# Patient Record
(demographics unavailable — no encounter records)

---

## 2024-12-29 NOTE — HISTORY OF PRESENT ILLNESS
[de-identified] : F/u vaccine  [FreeTextEntry6] : pt is here for flu vaccine  states that he has been doing well recently had a slight runny nose and cough

## 2024-12-29 NOTE — HISTORY OF PRESENT ILLNESS
[de-identified] : F/u vaccine  [FreeTextEntry6] : pt is here for flu vaccine  states that he has been doing well recently had a slight runny nose and cough

## 2025-03-20 NOTE — PHYSICAL EXAM

## 2025-03-20 NOTE — HISTORY OF PRESENT ILLNESS
[Mother] : mother [whole ___ oz/d] : consumes [unfilled] oz of whole cow's milk per day [Fruit] : fruit [Meat] : meat [Grains] : grains [Eggs] : eggs [Fish] : fish [Dairy] : dairy [Vitamin] : Patient takes vitamin daily [___ stools per day] : [unfilled]  stools per day [Normal] : Normal [In own bed] : In own bed [Brushing teeth] : Brushing teeth [Yes] : Patient goes to dentist yearly [Toothpaste] : Primary Fluoride Source: Toothpaste [Curiosity about body] : Curiosity about body [Playtime (60 min/d)] : Playtime 60 min a day [< 2 hrs of screen time] : Less than 2 hrs of screen time [Appropiate parent-child communication] : Appropriate parent-child communication [Child given choices] : Child given choices [Child Cooperates] : Child cooperates [Parent has appropriate responses to behavior] : Parent has appropriate responses to behavior [No] : Not at  exposure [Water heater temperature set at <120 degrees F] : Water heater temperature set at <120 degrees F [Car seat in back seat] : Car seat in back seat [Carbon Monoxide Detectors] : Carbon monoxide detectors [Smoke Detectors] : Smoke detectors [Supervised outdoor play] : Supervised outdoor play [NO] : No [Exposure to electronic nicotine delivery system] : No exposure to electronic nicotine delivery system [FreeTextEntry9] : 3k

## 2025-04-22 NOTE — HISTORY OF PRESENT ILLNESS
[de-identified] : vaccine [FreeTextEntry6] : pt is here for vaccine visit. No fever during time of the visit. has been well  wanted to also review blood testing and see what other vaccines are necessary at this time

## 2025-04-22 NOTE — HISTORY OF PRESENT ILLNESS
[de-identified] : vaccine [FreeTextEntry6] : pt is here for vaccine visit. No fever during time of the visit. has been well  wanted to also review blood testing and see what other vaccines are necessary at this time

## 2025-04-22 NOTE — DISCUSSION/SUMMARY
[FreeTextEntry1] : BW shows immunity to MMR  [] : The components of the vaccine(s) to be administered today are listed in the plan of care. The disease(s) for which the vaccine(s) are intended to prevent and the risks have been discussed with the caretaker.  The risks are also included in the appropriate vaccination information statements which have been provided to the patient's caregiver.  The caregiver has given consent to vaccinate.